# Patient Record
Sex: MALE | Race: WHITE
[De-identification: names, ages, dates, MRNs, and addresses within clinical notes are randomized per-mention and may not be internally consistent; named-entity substitution may affect disease eponyms.]

---

## 2017-09-11 ENCOUNTER — HOSPITAL ENCOUNTER (OUTPATIENT)
Dept: HOSPITAL 79 - CT | Age: 82
End: 2017-09-11
Attending: UROLOGY
Payer: MEDICARE

## 2017-09-11 DIAGNOSIS — N28.89: ICD-10-CM

## 2017-09-11 DIAGNOSIS — N28.9: ICD-10-CM

## 2017-09-11 DIAGNOSIS — E86.0: ICD-10-CM

## 2017-09-11 DIAGNOSIS — D49.519: Primary | ICD-10-CM

## 2024-06-27 ENCOUNTER — OFFICE VISIT (OUTPATIENT)
Dept: ORTHOPEDIC SURGERY | Facility: CLINIC | Age: 89
End: 2024-06-27
Payer: MEDICARE

## 2024-06-27 VITALS
WEIGHT: 207 LBS | DIASTOLIC BLOOD PRESSURE: 80 MMHG | HEIGHT: 68 IN | SYSTOLIC BLOOD PRESSURE: 122 MMHG | BODY MASS INDEX: 31.37 KG/M2

## 2024-06-27 DIAGNOSIS — M25.561 PAIN IN BOTH KNEES, UNSPECIFIED CHRONICITY: Primary | ICD-10-CM

## 2024-06-27 DIAGNOSIS — M25.662 KNEE JOINT STIFFNESS, BILATERAL: ICD-10-CM

## 2024-06-27 DIAGNOSIS — M25.661 KNEE JOINT STIFFNESS, BILATERAL: ICD-10-CM

## 2024-06-27 DIAGNOSIS — M17.0 PRIMARY OSTEOARTHRITIS OF BOTH KNEES: ICD-10-CM

## 2024-06-27 DIAGNOSIS — M25.562 PAIN IN BOTH KNEES, UNSPECIFIED CHRONICITY: Primary | ICD-10-CM

## 2024-06-27 DIAGNOSIS — R29.898 WEAKNESS OF BOTH LOWER EXTREMITIES: ICD-10-CM

## 2024-06-27 PROCEDURE — 20610 DRAIN/INJ JOINT/BURSA W/O US: CPT | Performed by: PHYSICIAN ASSISTANT

## 2024-06-27 PROCEDURE — 1159F MED LIST DOCD IN RCRD: CPT | Performed by: PHYSICIAN ASSISTANT

## 2024-06-27 PROCEDURE — 1160F RVW MEDS BY RX/DR IN RCRD: CPT | Performed by: PHYSICIAN ASSISTANT

## 2024-06-27 PROCEDURE — 99204 OFFICE O/P NEW MOD 45 MIN: CPT | Performed by: PHYSICIAN ASSISTANT

## 2024-06-27 RX ORDER — ASPIRIN 81 MG/1
81 TABLET ORAL DAILY
COMMUNITY

## 2024-06-27 RX ORDER — CHLORAL HYDRATE 500 MG
1 CAPSULE ORAL DAILY
COMMUNITY

## 2024-06-27 RX ORDER — LANSOPRAZOLE 30 MG/1
CAPSULE, DELAYED RELEASE ORAL
COMMUNITY
Start: 2024-05-09

## 2024-06-27 RX ORDER — SIMVASTATIN 20 MG
20 TABLET ORAL NIGHTLY
COMMUNITY
Start: 2024-04-09

## 2024-06-27 RX ORDER — MULTIPLE VITAMINS W/ MINERALS TAB 9MG-400MCG
1 TAB ORAL DAILY
COMMUNITY

## 2024-06-27 RX ORDER — DONEPEZIL HYDROCHLORIDE 5 MG/1
1 TABLET, FILM COATED ORAL DAILY
COMMUNITY
Start: 2024-05-09

## 2024-06-27 RX ORDER — ACETAMINOPHEN 500 MG
500 TABLET ORAL 2 TIMES DAILY
COMMUNITY

## 2024-06-27 RX ORDER — BUSPIRONE HYDROCHLORIDE 5 MG/1
5 TABLET ORAL 2 TIMES DAILY
COMMUNITY

## 2024-06-27 RX ORDER — AMLODIPINE BESYLATE 5 MG/1
5 TABLET ORAL DAILY
COMMUNITY

## 2024-06-27 RX ORDER — LEVOTHYROXINE SODIUM 0.15 MG/1
1 TABLET ORAL DAILY
COMMUNITY
Start: 2024-05-30

## 2024-06-27 RX ORDER — METOPROLOL SUCCINATE 50 MG/1
1 TABLET, EXTENDED RELEASE ORAL DAILY
COMMUNITY
Start: 2024-04-23

## 2024-06-27 NOTE — PROGRESS NOTES
[facility] [place] [facility] Note    Subjective     Chief Complaint   Patient presents with    Right Knee - Pain    Left Knee - Pain        HPI  Denver Smallwood is a 88 y.o. male.  New patient presents for evaluation of bilateral knees.  Symptoms/pain have been ongoing for about 6 months.  RUBY: No history of injury or trauma, left is worse than right.  Patient denies any significant pain complains more of giving way and weakness to both lower extremities.    Pain scale: 3/10.  Severity of the pain mild to moderate.  Quality of the pain dull.  Associated symptoms pain, stiffness, giving away/buckling.  Activity related to pain walking, standing, sitting, rising from a seated position.  Prior treatments rollator and cane, anti-inflammatories, Tylenol.    No reported mechanical symptoms, such as locking or catching.    Denies fever, chills, night sweats or other constitutional symptoms.    Past Medical History:   Diagnosis Date    Atrial fib/flutter, transient     High cholesterol     Hypothyroid       Past Surgical History:   Procedure Laterality Date    CATARACT EXTRACTION        History reviewed. No pertinent family history.  Social History     Socioeconomic History    Marital status:    Tobacco Use    Smoking status: Never     Passive exposure: Never    Smokeless tobacco: Former     Types: Chew   Vaping Use    Vaping status: Never Used   Substance and Sexual Activity    Alcohol use: Never    Drug use: Never    Sexual activity: Defer      Current Outpatient Medications on File Prior to Visit   Medication Sig Dispense Refill    acetaminophen (TYLENOL) 500 MG tablet Take 1 tablet by mouth 2 (Two) Times a Day.      amLODIPine (NORVASC) 5 MG tablet Take 1 tablet by mouth Daily.      aspirin 81 MG EC tablet Take 1 tablet by mouth Daily.      busPIRone (BUSPAR) 5 MG tablet Take 1 tablet by mouth 2 (Two) Times a Day.      Cholecalciferol 125 MCG (5000 UT) tablet Take 1 tablet by mouth Daily.      donepezil  "(ARICEPT) 5 MG tablet Take 1 tablet by mouth Daily.      lansoprazole (PREVACID) 30 MG capsule TAKE 1 CAPSULE BY MOUTH EVERY DAY FOR STOMACH      levothyroxine (SYNTHROID, LEVOTHROID) 150 MCG tablet Take 1 tablet by mouth Daily.      metoprolol succinate XL (TOPROL-XL) 50 MG 24 hr tablet Take 1 tablet by mouth Daily.      multivitamin with minerals (MULTIVITAMIN ADULT PO) Take 1 tablet by mouth Daily.      multivitamin with minerals (multivitamin, eye vitamin,) tablet tablet Take 1 tablet by mouth Daily.      Omega-3 Fatty Acids (fish oil) 1000 MG capsule capsule Take 1 capsule by mouth Daily.      simvastatin (ZOCOR) 20 MG tablet Take 1 tablet by mouth Every Night.       No current facility-administered medications on file prior to visit.      Allergies   Allergen Reactions    Diltiazem Hcl Rash        The following portions of the patient's history were reviewed and updated as appropriate: allergies, current medications, past family history, past medical history, past social history, past surgical history, and problem list.    Review of Systems   Constitutional: Negative.    HENT: Negative.     Eyes: Negative.    Respiratory: Negative.     Cardiovascular: Negative.    Gastrointestinal: Negative.    Endocrine: Negative.    Genitourinary: Negative.    Musculoskeletal:  Positive for arthralgias.   Skin: Negative.    Allergic/Immunologic: Negative.    Neurological: Negative.    Hematological: Negative.    Psychiatric/Behavioral: Negative.          Objective      Physical Exam  /80   Ht 172.7 cm (68\")   Wt 93.9 kg (207 lb)   BMI 31.47 kg/m²     Body mass index is 31.47 kg/m².    GENERAL APPEARANCE: awake, alert & oriented x 3, in no acute distress and well developed, well nourished  PSYCH: normal mood and affect  LUNGS:  breathing nonlabored, no wheezing  EYES: sclera anicteric, pupils equal  CARDIOVASCULAR: palpable pulses. Capillary refill less than 2 seconds  INTEGUMENTARY: skin intact, no clubbing, " cyanosis  NEUROLOGIC:  Normal Sensation    Using rollator to assist with ambulation.  Stiff lower leg gait pattern, shuffling.        Ortho Exam  Bilateral knees knee  Alignment: Mild genu varum  Skin: Intact without any erythema, warmth, swelling or evidence of infection.  Motion: 5-115° with crepitus and stiffness noted.  Tenderness: No significant tenderness on exam today.  Instability: Lachman negative.  Varus and valgus stress test negative.   Patella: Compression/grind positive.  Straight leg raise: Intact.  Motor: Grossly intact Q/HS/TA/GS/EHL/P  Sensory: Grossly intact DP/SP/S/S/T nerve distributions.       Imaging/Studies  Ordered bilateral knees plain films.  Imaging read/interpreted by Dr. Lockwood.    Imaging Results (Last 7 Days)       Procedure Component Value Units Date/Time    XR Knee 4+ View Bilateral [[id number]] Resulted: 06/27/24 1356     Updated: 06/27/24 1356    Narrative:      Indication: Bilateral knee pain    Comparison: No prior xrays are available for comparison      Impression:           Right Knee: mild to moderate varus osteoarthritis with slight medial   compartment joint space narrowing, Mild patellofemoral osteoarthritis     Left Knee: mild to moderate varus osteoarthritis with slight medial   compartment joint space narrowing, Mild patellofemoral osteoarthritis                Assessment/Plan        ICD-10-CM ICD-9-CM   1. Pain in both knees, unspecified chronicity  M25.561 719.46    M25.562    2. Weakness of both lower extremities  R29.898 729.89   3. Knee joint stiffness, bilateral  M25.661 719.56    M25.662    4. Primary osteoarthritis of both knees  M17.0 715.16       Orders Placed This Encounter   Procedures    - Large Joint Arthrocentesis: bilateral knee    XR Knee 4+ View Bilateral    Ambulatory Referral to Physical Therapy for Evaluation & Treatment        -Bilateral knee pain due to weakness, joint stiffness and osteoarthritis.  -Reviewed imaging with the patient and  family.  -Discussion was had with all regarding treatment options.  -Recommend proceeding with viscosupplementation series to see if this helps with the stiffness that he is noting to his knees.  -Authorization placed for viscosupplementation series and first injection was able to be given today.  -Referred to formal PT.  -Recommend OTC pain medication as needed.  -Follow up in 1 week for 2/3 of Orthovisc.  -Questions and concerns answered.    After discussing risks of injection the patient gave consent to proceed.  Both knees were confirmed as the correct joints to be injected with a timeout.  Each knee was then prepped with Hibiclens and injected with a prefilled syringe of Orthovisc without any resistance using anterior lateral approach, patient in seated position.  The patient tolerated procedure well.  Hemostasis was achieved and a Band-Aid was applied over the injection site.  I instructed the patient on signs and symptoms of infection.  They should report to the ED if any of these develop.  Recommended modifying activity to include rest, ice, elevation and/or heat along with oral pain medication as needed.        Medical Decision Making  Management Options : over-the-counter medicine, prescription/IM medicine, and physical/occupational therapy  Data/Risk: radiology tests      Dorcas Pelletier PA-C  06/27/24  14:25 EDT               EMR Dragon/Transcription disclaimer:  Much of this encounter note is an electronic transcription of spoken language to printed text. Electronic transcription of spoken language may permit erroneous, or at times, nonsensical words or phrases to be inadvertently transcribed. Although I have reviewed the note for such errors, some may still exist.

## 2024-06-27 NOTE — PROGRESS NOTES
Procedure   - Large Joint Arthrocentesis: bilateral knee on 6/27/2024 2:04 PM  Indications: pain  Details: 21 G needle, anterolateral approach  Medications (Right): 30 mg Hyaluronan 30 MG/2ML  Medications (Left): 30 mg Hyaluronan 30 MG/2ML  Outcome: tolerated well, no immediate complications  Procedure, treatment alternatives, risks and benefits explained, specific risks discussed. Consent was given by the patient. Immediately prior to procedure a time out was called to verify the correct patient, procedure, equipment, support staff and site/side marked as required. Patient was prepped and draped in the usual sterile fashion.

## 2024-07-08 ENCOUNTER — CLINICAL SUPPORT (OUTPATIENT)
Dept: ORTHOPEDIC SURGERY | Facility: CLINIC | Age: 89
End: 2024-07-08
Payer: MEDICARE

## 2024-07-08 DIAGNOSIS — M17.0 PRIMARY OSTEOARTHRITIS OF BOTH KNEES: Primary | ICD-10-CM

## 2024-07-08 PROCEDURE — 20610 DRAIN/INJ JOINT/BURSA W/O US: CPT | Performed by: PHYSICIAN ASSISTANT

## 2024-07-08 NOTE — PROGRESS NOTES
CC: Follow-up bilateral knee osteoarthritis, 2/3 Orthovisc injection today    History of present illness: Patient presents for his Orthovisc injection to the bilateral knee today.  At this time the patient denies any numbness or tingling into the distal extremity.  No fever, chills, night sweats or other constitutional symptoms.    Some improvement following the initial injection.    See chart for PMH, PSH, Meds, All - reviewed.    Ortho exam:  Bilateral knee  Skin: Intact without redness, warmth or swelling/effusion.  Tenderness: Moderate medial joint line tenderness.  Motor/sensory: Grossly intact L2-S1.    Assessment/plan:  Bilateral knee osteoarthritis    Proceed today with 2/3 of Orthovisc injection.  Patient will follow-up in 1 week for third injection.      After discussing risks of injection the patient gave consent to proceed.  Both knees were confirmed as the correct joint to be injected with a timeout.  Each knee was then prepped with Hibiclens and injected with a prefilled syringe of Orthovisc without any resistance using anterior lateral approach, patient in seated position.  The patient tolerated procedure well.  Hemostasis was achieved and a Band-Aid was applied over the injection site.  I instructed the patient on signs and symptoms of infection.  They should report to the ED if any of these develop.  Recommended modifying activity to include rest, ice, elevation and/or heat along with oral pain medication as needed.

## 2024-07-08 NOTE — PROGRESS NOTES
Procedure   - Large Joint Arthrocentesis: bilateral knee on 7/8/2024 10:51 AM  Indications: pain  Details: 21 G needle, anterolateral approach  Medications (Right): 30 mg Hyaluronan 30 MG/2ML  Medications (Left): 30 mg Hyaluronan 30 MG/2ML  Outcome: tolerated well, no immediate complications  Procedure, treatment alternatives, risks and benefits explained, specific risks discussed. Consent was given by the patient. Immediately prior to procedure a time out was called to verify the correct patient, procedure, equipment, support staff and site/side marked as required. Patient was prepped and draped in the usual sterile fashion.

## 2024-07-08 NOTE — PROGRESS NOTES
[facility] [place] [facility] Note        Subjective     CC: Injections (Bilateral knee orthovisc #2 injections)      HPI Denver Smallwood is a 88 y.o. male. ***     Overall, patient's symptoms are ***    ROS:    Constiutional:Pt denies fever, chills, nausea, or vomiting.  MSK:as above        Objective      Past Medical History  Past Medical History:   Diagnosis Date    Atrial fib/flutter, transient     High cholesterol     Hypothyroid      Social History     Socioeconomic History    Marital status:    Tobacco Use    Smoking status: Never     Passive exposure: Never    Smokeless tobacco: Former     Types: Chew   Vaping Use    Vaping status: Never Used   Substance and Sexual Activity    Alcohol use: Never    Drug use: Never    Sexual activity: Defer          Physical Exam  There were no vitals taken for this visit.    There is no height or weight on file to calculate BMI.    Patient is well nourished and well developed.        Ortho Exam  ***    Imaging/Labs/EMG Reviewed and Interpreted:  Imaging Results (Last 24 Hours)       ** No results found for the last 24 hours. **              Assessment    Assessment:  No diagnosis found.    Plan:  Recommend over the counter anti-inflammatories for pain and/or swelling  ***      Josselin Yoder CMA  07/08/24  10:46 EDT      Dictated Utilizing Dragon Dictation.

## 2024-07-15 ENCOUNTER — TELEPHONE (OUTPATIENT)
Dept: ORTHOPEDIC SURGERY | Facility: CLINIC | Age: 89
End: 2024-07-15

## 2024-07-15 DIAGNOSIS — R29.898 WEAKNESS OF BOTH LOWER EXTREMITIES: Primary | ICD-10-CM

## 2024-07-15 DIAGNOSIS — M25.561 PAIN IN BOTH KNEES, UNSPECIFIED CHRONICITY: ICD-10-CM

## 2024-07-15 DIAGNOSIS — M25.562 PAIN IN BOTH KNEES, UNSPECIFIED CHRONICITY: ICD-10-CM

## 2024-07-15 DIAGNOSIS — M17.0 PRIMARY OSTEOARTHRITIS OF BOTH KNEES: ICD-10-CM

## 2024-07-15 NOTE — TELEPHONE ENCOUNTER
Caller: ELOY     Relationship: DAUGHTER     Best call back number: [phone]      What orders are you requesting (i.e. lab or imaging): PT       Where will you receive your lab/imaging services:   [facility], [facility]   PHONE: [phone]

## 2024-07-18 ENCOUNTER — CLINICAL SUPPORT (OUTPATIENT)
Dept: ORTHOPEDIC SURGERY | Facility: CLINIC | Age: 89
End: 2024-07-18
Payer: MEDICARE

## 2024-07-18 DIAGNOSIS — M17.0 PRIMARY OSTEOARTHRITIS OF BOTH KNEES: Primary | ICD-10-CM

## 2024-07-18 NOTE — PROGRESS NOTES
Procedure   - Large Joint Arthrocentesis: bilateral knee on 7/18/2024 2:36 PM  Indications: pain  Details: 21 G needle, anterolateral approach  Medications (Right): 30 mg Hyaluronan 30 MG/2ML  Medications (Left): 30 mg Hyaluronan 30 MG/2ML  Outcome: tolerated well, no immediate complications  Procedure, treatment alternatives, risks and benefits explained, specific risks discussed. Consent was given by the patient. Immediately prior to procedure a time out was called to verify the correct patient, procedure, equipment, support staff and site/side marked as required. Patient was prepped and draped in the usual sterile fashion.

## 2024-07-18 NOTE — PROGRESS NOTES
CC: Follow-up bilateral knee osteoarthritis, 3/3 Orthovisc injection today     History of present illness: Patient presents for his third Orthovisc injection to the bilateral knee today.  At this time the patient denies any numbness or tingling into the distal extremity.  No fever, chills, night sweats or other constitutional symptoms.     Some improvement following the injections. Due to start PT.     See chart for PMH, PSH, Meds, All - reviewed.     Ortho exam:  Bilateral knee  Skin: Intact without redness, warmth or swelling/effusion.  Tenderness: Moderate medial joint line tenderness.  Motor/sensory: Grossly intact L2-S1.     Assessment/plan:  Bilateral knee osteoarthritis     Proceed today with 3/3 of Orthovisc injection.  Patient will follow-up as needed.       After discussing risks of injection the patient gave consent to proceed.  Both knees were confirmed as the correct joint to be injected with a timeout.  Each knee was then prepped with Hibiclens and injected with a prefilled syringe of Orthovisc without any resistance using anterior lateral approach, patient in seated position.  The patient tolerated procedure well.  Hemostasis was achieved and a Band-Aid was applied over the injection site.  I instructed the patient on signs and symptoms of infection.  They should report to the ED if any of these develop.  Recommended modifying activity to include rest, ice, elevation and/or heat along with oral pain medication as needed.